# Patient Record
(demographics unavailable — no encounter records)

---

## 2018-01-11 NOTE — PSYCH
Psych Med Mgmt    Appearance: was calm and cooperative  Observed mood: mood appropriate  Observed mood: affect appropriate  Speech: speech soft  Thought processes: poverty of thought was observed  Hallucinations: no hallucinations present  Thought Content: no delusions  Abnormal Thoughts: The patient has no suicidal thoughts and no homicidal thoughts  Orientation: The patient is disoriented  Attention Span And Concentration: concentration impaired  Insight: Poor insight  Judgment: His judgment was limited  Muscle Strength And Tone  unsteady/seated on walker  Language:  fair  Fund of knowledge: Patient displays  poor  Treatment Recommendations: Restart Namenda XR  Given titration pack  Risks, Benefits And Possible Side Effects Of Medications: Risks, benefits, and possible side effects of medications explained to patient and patient verbalizes understanding  He reports normal appetite, normal energy level, recent lbs weight loss and normal number of sleep hours  Pt seen accompanied by spouse and aide  He was doing well with Namenda but "ran out"  Since being off Namenda his moods are more irritable, not sleeping well, agitated  Pt offers no complaints and concerns  He is not walking as well- advised to contact PCP re: physical therapy  Also has peg tube and no follow up for 3 years- aide notes some bacterial growth around tube  Given name for Gastroenterology Dr Amaral [facility] tube [facility]  Vitals  Signs [Data Includes: Current Encounter]   Recorded: 13Apr2016 09:59AM   Heart Rate: 85  Respiration: 16  Systolic: 442  Diastolic: 84    DSM    Provisional Diagnosis: Dementia Alzheimers type  Mood Disorder NOS  Assessment    1  Late onset Alzheimer's disease with behavioral disturbance (331 0,294 11)   (G30 1,F02 81)    Plan    1   Namenda XR 28 MG Oral Capsule Extended Release 24 Hour; 1 PO QD    Review of Systems    Constitutional: No fever, no chills, feels well, no tiredness, no recent weight gain or loss  Cardiovascular: no complaints of slow or fast heart rate, no chest pain, no palpitations  Respiratory: no complaints of shortness of breath, no wheezing, no dyspnea on exertion  Gastrointestinal: no complaints of abdominal pain, no constipation, no nausea, no diarrhea, no vomiting  Genitourinary: no complaints of dysuria, no incontinence, no pelvic pain, no urinary frequency  Musculoskeletal: gait poor  Current Meds   1  Lisinopril 10 MG Oral Tablet; Therapy: (Recorded:13Apr2016) to Recorded   2  Vitamin B-12 100 MCG Oral Tablet; Therapy: (Recorded:13Apr2016) to Recorded    Family Psych History    1  Family history of Anxiety    The family history was reviewed and updated today  Social History    · Never a smoker   · No alcohol use  The social history was reviewed and updated today  The social history was reviewed and is unchanged  End of Encounter Meds    1  Namenda XR 28 MG Oral Capsule Extended Release 24 Hour; 1 PO QD; Therapy: 13Apr2016 to (Last Rx:13Apr2016)  Requested for: 13Apr2016 Ordered    2  Lisinopril 10 MG Oral Tablet; Therapy: (Recorded:[id number]) to Recorded   3  Vitamin B-12 100 MCG Oral Tablet; Therapy: (Recorded:13Apr2016) to Recorded    Signatures   Electronically signed by : MATTHEW Cade;  Apr 13 2016 10:13AM EST                       (Author)    Electronically signed by : Lucero Ayala MD; Apr 18 2016  3:32PM EST

## 2018-01-13 NOTE — PSYCH
Psych Med Mgmt    Appearance: was calm and cooperative  Observed mood: mood appropriate  Observed mood: affect appropriate  Speech: a normal rate and speech soft  Hallucinations: no hallucinations present  Thought Content: no delusions  Abnormal Thoughts: The patient has no suicidal thoughts and no homicidal thoughts  Orientation: The patient is disoriented and oriented to time  Recent and Remote Memory: short term memory intact and long term memory intact  Treatment Recommendations: Pt to continue on Namneda XR 28 mgpo qd  Add low dose Trazodone 50 mg 1/2 po qhs  Risks, Benefits And Possible Side Effects Of Medications: Risks, benefits, and possible side effects of medications explained to patient and patient verbalizes understanding  He reports normal appetite, normal energy level, no weight change and normal number of sleep hours  Pt seen for follow up Dementia- pt seen with his spouse, daughter and caregiver  He has a fair appetite  He has had some problems with sleep  Family notes some aggression but usually not physical  He was hospitalized for aspiration pneumonia  He had increased confusion in hospital and was paranoid for a few days when he first came home  His memory has been fairly stable since back at home  He has physical therapy at home and has home health aid  He does walk with walker  Some tremor noted when eating per family- he also has peg tube  Vitals  Signs   Recorded: [id number] 70:23CU   Systolic: 302  Diastolic: 68  Heart Rate: 71  Recorded: 38Icw1889 01:15PM   Respiration: 16  Height: 5 ft 7 in  Weight: 124 lb   BMI Calculated: 19 42  BSA Calculated: 1 65    DSM    Provisional Diagnosis: Dementia with Beh Dist  Insomnia  Assessment    1  Late onset Alzheimer's disease with behavioral disturbance (331 0,294 11)   (G30 1,F02 81)   2  Insomnia (780 52) (G47 00)    Plan    1  TraZODone HCl - 50 MG Oral Tablet; 1/2 po qhs    2   Namenda XR 28 MG Oral Capsule Extended Release 24 Hour; 1 po qhs    Review of Systems    Constitutional: No fever, no chills, feels well, no tiredness, no recent weight gain or loss  Cardiovascular: no complaints of slow or fast heart rate, no chest pain, no palpitations  Respiratory: no complaints of shortness of breath, no wheezing, no dyspnea on exertion  Gastrointestinal: no complaints of abdominal pain, no constipation, no nausea, no diarrhea, no vomiting  Genitourinary: no complaints of dysuria, no incontinence, no pelvic pain, no urinary frequency  Musculoskeletal: no complaints of arthralgia, no myalgias, no limb pain, no joint stiffness  Integumentary: no complaints of skin rash, no itching, no dry skin  Neurological: no complaints of headache, no confusion, no numbness, no dizziness  Active Problems    1  Late onset Alzheimer's disease with behavioral disturbance (331 0,294 11)   (G30 1,F02 81)    Allergies    1  No Known Drug Allergies    Current Meds   1  Lisinopril 10 MG Oral Tablet; Therapy: (Recorded:36Eqh1192) to Recorded   2  Namenda XR 28 MG Oral Capsule Extended Release 24 Hour; 1 PO QD; Therapy: 13Apr2016 to (Last Rx:13Apr2016)  Requested for: 13Apr2016 Ordered   3  Sinemet  MG Oral Tablet; Therapy: (Recorded:69Feb7769) to Recorded   4  Vitamin B-12 100 MCG Oral Tablet; Therapy: (Recorded:50Hrk9009) to Recorded    The medication list was reviewed and updated today  Family Psych History  Daughter    1  Family history of Anxiety    The family history was reviewed and updated today  Social History    · Never a smoker   · No alcohol use  The social history was reviewed and updated today  The social history was reviewed and is unchanged  End of Encounter Meds    1  TraZODone HCl - 50 MG Oral Tablet; 1/2 po qhs;   Therapy: [id number] to (Last Rx:[id number])  Requested for: 68Ore7379 Ordered    2   Namenda XR 28 MG Oral Capsule Extended Release 24 Hour; 1 po qhs;   Therapy: 13Apr2016 to (Last Rx:[id number])  Requested for: 95Pps6532 Ordered    3  Lisinopril 10 MG Oral Tablet; Therapy: (Recorded:13Apr2016) to Recorded   4  Sinemet  MG Oral Tablet (Carbidopa-Levodopa); Therapy: (Recorded:12Aug2016) to Recorded   5  Vitamin B-12 100 MCG Oral Tablet; Therapy: (Recorded:13Apr2016) to Recorded    Signatures   Electronically signed by : MATTHEW Kennedy;  Aug 12 2016  1:23PM EST                       (Author)    Electronically signed by : Cyrus Diallo MD; Aug 15 2016  4:03PM EST